# Patient Record
Sex: FEMALE | Race: WHITE | NOT HISPANIC OR LATINO | URBAN - METROPOLITAN AREA
[De-identification: names, ages, dates, MRNs, and addresses within clinical notes are randomized per-mention and may not be internally consistent; named-entity substitution may affect disease eponyms.]

---

## 2020-05-16 ENCOUNTER — EMERGENCY (EMERGENCY)
Facility: HOSPITAL | Age: 25
LOS: 1 days | Discharge: HOME | End: 2020-05-16
Attending: EMERGENCY MEDICINE | Admitting: EMERGENCY MEDICINE
Payer: MEDICAID

## 2020-05-16 VITALS
TEMPERATURE: 98 F | SYSTOLIC BLOOD PRESSURE: 129 MMHG | OXYGEN SATURATION: 99 % | DIASTOLIC BLOOD PRESSURE: 66 MMHG | HEART RATE: 67 BPM | RESPIRATION RATE: 20 BRPM

## 2020-05-16 PROCEDURE — 99283 EMERGENCY DEPT VISIT LOW MDM: CPT

## 2020-05-16 RX ORDER — RABIES VACC, HUMAN DIPLOID/PF 2.5 UNIT
1 VIAL (EA) INTRAMUSCULAR ONCE
Refills: 0 | Status: COMPLETED | OUTPATIENT
Start: 2020-05-16 | End: 2020-05-16

## 2020-05-16 RX ORDER — TETANUS TOXOID, REDUCED DIPHTHERIA TOXOID AND ACELLULAR PERTUSSIS VACCINE, ADSORBED 5; 2.5; 8; 8; 2.5 [IU]/.5ML; [IU]/.5ML; UG/.5ML; UG/.5ML; UG/.5ML
0.5 SUSPENSION INTRAMUSCULAR ONCE
Refills: 0 | Status: COMPLETED | OUTPATIENT
Start: 2020-05-16 | End: 2020-05-16

## 2020-05-16 RX ORDER — RABIES VACC, HUMAN DIPLOID/PF 2.5 UNIT
1 VIAL (EA) INTRAMUSCULAR ONCE
Refills: 0 | Status: DISCONTINUED | OUTPATIENT
Start: 2020-05-16 | End: 2020-05-16

## 2020-05-16 RX ADMIN — Medication 1 MILLILITER(S): at 22:15

## 2020-05-16 RX ADMIN — TETANUS TOXOID, REDUCED DIPHTHERIA TOXOID AND ACELLULAR PERTUSSIS VACCINE, ADSORBED 0.5 MILLILITER(S): 5; 2.5; 8; 8; 2.5 SUSPENSION INTRAMUSCULAR at 21:55

## 2020-05-16 RX ADMIN — Medication 1 TABLET(S): at 21:54

## 2020-05-16 NOTE — ED PROVIDER NOTE - ATTENDING CONTRIBUTION TO CARE
I performed a face-to-face evaluation of the patient and performed a history and physical examination. I agree with the history and physical examination.    Bit by unknown pit bull on L 2nd digit distal area. Tetanus shot status unknown. No e/o tendon injury. NVI. Small, non-suturable lac there. Give tetanus shot, rabies vaccine series, and Augmentin.

## 2020-05-16 NOTE — ED PROVIDER NOTE - PROGRESS NOTE DETAILS
Radha PGY1: Patient reevaluated and feeling better. VSS. Reviewed and discussed results with patient. Discussed importance of follow up and return precautions. Patient agrees with plan.

## 2020-05-16 NOTE — ED ADULT TRIAGE NOTE - CHIEF COMPLAINT QUOTE
"I was walking and I pet a dog, and he bit me, it was a stranger dog I am not sure if he was vaccinated" bite on right index finger

## 2020-05-16 NOTE — ED PROVIDER NOTE - PHYSICAL EXAMINATION
Physical Exam:  Gen: NAD, AOx3, non-toxic appearing, able to ambulate without assistance  MSK: no visible deformities, ROM normal in UE/LE, no back TTP  Neuro: No focal sensory or motor deficits  Skin: 2 vertical 2 cm superficial hemostatic scrapes of dorsal 2nd digit L hand extending from distal to DIP to nailbed w/ mild swelling and minimal TTP, warm, well perfused, no rash, no leg swelling  Psych: normal affect, calm

## 2020-05-16 NOTE — ED ADULT NURSE NOTE - OBJECTIVE STATEMENT
Patient is a 24y female, A&OX4, ambulatory, complaining of a dog bite to the left hand ring finger. Vaccination status of the dog is unknown. Unknown last Tetanus shot. Medications administered as per order.

## 2020-05-16 NOTE — ED PROVIDER NOTE - DISPOSITION TYPE
Patient Education     Nasal Surgery: Septoplasty    You’re scheduled to have nasal surgery. The type of nasal surgery you’re having is called septoplasty. Read on to learn more about what to expect during this surgery.During surgery, the surgeon may remove cartilage and bone to reshape the deviated septum. After surgery, there is more breathing space. Enough cartilage and bone remain to give the nose support.  What to expect during septoplasty  This surgery repairs a blockage inside the nose caused by a deviated septum. With a deviated septum, there is a problem with the wall that divides the nose into two chambers. A deviated septum may block air coming through one or both nostrils. This makes it harder for you to breathe through your nose. During septoplasty, the surgeon makes incisions inside the nose. Then the surgeon trims, reshapes, moves, or removes cartilage and sometimes bone from the septum.  Risks and possible complications  As with any surgery, nasal surgery has some risks. These include a slight risk of bleeding and infection. Your doctor will discuss any other risks and complications with you.   After septoplasty  After septoplasty, you’ll be taken to a recovery area or to your hospital room. Your experience may be as follows:  · You may have packing material inside your nose. This reduces bleeding and promotes healing. You may also have bandages (dressings) on the outside of your nose.  · It’s normal to have some mucus and blood drain from your nose. Until packing is removed, you may have to breathe through your mouth.  · You may have some swelling or bruising around the eyelids if a rhinoplasty was also done.  · Expect some throat dryness and irritation.  · Pain medicine will be prescribed as needed. Don’t take medicine that contains aspirin or ibuprofen. These can cause increased bleeding.  Follow-up care  You’ll need to follow up with your doctor within a week after your surgery. Here is what to  expect:  · Any packing, splint, or dressings will probably be removed. You may feel slight discomfort and bleed a little when this is done.  · After the splint or packing is removed, you’ll most likely breathe better than you did before surgery.  · You may have minor numbness, pain, swelling, and a little stiffness under the tip of the nose.  · In a few days, the inside of your nose may swell and briefly block your breathing. Or a scab or crust may block breathing for a short time. Leave the scab alone. Your doctor can remove it. Using saline (irrigation or aerosol) regularly after surgery helps to reduce the amount of crusting at each visit.  · Contact your healthcare provider if you have any questions or concerns.  Date Last Reviewed: 11/1/2016  © 6122-1914 [employer]. [address] [place] [address], [place] [address]. All rights reserved. This information is not intended as a substitute for professional medical care. Always follow your healthcare professional's instructions.            DISCHARGE

## 2020-05-16 NOTE — ED PROVIDER NOTE - NSFOLLOWUPINSTRUCTIONS_ED_ALL_ED_FT
- Continue all regular medications  - For pain, take tylenol or ibuprofen as directed on the packaging  - Return to the ER on 5/19, 5/23 and 5/30 for the remaining rabies vaccination series  - You were given copies of labs and/or imaging results if applicable, please take them to your follow up appointments  - Return to the ER for fever, severe swelling and pain or pus at the site of the bite or any worsening symptoms or concerns

## 2020-05-16 NOTE — ED ADULT NURSE NOTE - NSIMPLEMENTINTERV_GEN_ALL_ED
Implemented All Universal Safety Interventions:  [place] to call system. Call bell, personal items and telephone within reach. Instruct patient to call for assistance. Room bathroom lighting operational. Non-slip footwear when patient is off stretcher. Physically safe environment: no spills, clutter or unnecessary equipment. Stretcher in lowest position, wheels locked, appropriate side rails in place.

## 2020-05-16 NOTE — ED PROVIDER NOTE - CLINICAL SUMMARY MEDICAL DECISION MAKING FREE TEXT BOX
Azeb: Bit by unknown pit bull on L 2nd digit distal area. Tetanus shot status unknown. No e/o tendon injury. NVI. Small, non-suturable lac there. Give tetanus shot, rabies vaccine series, and Augmentin.

## 2020-05-16 NOTE — ED PROVIDER NOTE - OBJECTIVE STATEMENT
23yo female no PMH p/w request for rabbies vaccine s/p dog bite to L 2nd digit this evening. Dog had owner but was unknown to patient and vaccination status of dog unknown. Went to  and referred to ED as they did not have vaccine. Patient thoroughly washed wound with soap and water and now hemostatic. Little tissue available around wound for immunoglobulin, patient agrees not to receive immunoglobulin at this time.

## 2020-05-16 NOTE — ED PROVIDER NOTE - PATIENT PORTAL LINK FT
You can access the FollowMyHealth Patient Portal offered by [facility] by registering at the following website: [email or web address]. By joining [employer]’s FollowMyHealth portal, you will also be able to view your health information using other applications (apps) compatible with our system.

## 2020-05-19 ENCOUNTER — EMERGENCY (EMERGENCY)
Facility: HOSPITAL | Age: 25
LOS: 1 days | Discharge: HOME | End: 2020-05-19
Attending: STUDENT IN AN ORGANIZED HEALTH CARE EDUCATION/TRAINING PROGRAM | Admitting: STUDENT IN AN ORGANIZED HEALTH CARE EDUCATION/TRAINING PROGRAM
Payer: MEDICAID

## 2020-05-19 VITALS
DIASTOLIC BLOOD PRESSURE: 61 MMHG | HEART RATE: 69 BPM | SYSTOLIC BLOOD PRESSURE: 111 MMHG | OXYGEN SATURATION: 99 % | RESPIRATION RATE: 16 BRPM | TEMPERATURE: 99 F

## 2020-05-19 PROCEDURE — 99281 EMR DPT VST MAYX REQ PHY/QHP: CPT

## 2020-05-19 RX ORDER — RABIES VACC, HUMAN DIPLOID/PF 2.5 UNIT
1 VIAL (EA) INTRAMUSCULAR ONCE
Refills: 0 | Status: COMPLETED | OUTPATIENT
Start: 2020-05-19 | End: 2020-05-19

## 2020-05-19 RX ORDER — RABIES VACC, HUMAN DIPLOID/PF 2.5 UNIT
1 VIAL (EA) INTRAMUSCULAR ONCE
Refills: 0 | Status: DISCONTINUED | OUTPATIENT
Start: 2020-05-19 | End: 2020-05-19

## 2020-05-19 RX ADMIN — Medication 1 MILLILITER(S): at 22:25

## 2020-05-19 NOTE — ED PROVIDER NOTE - PHYSICAL EXAMINATION
Vital signs reviewed.   CONSTITUTIONAL: Well-appearing; well-nourished; in no apparent distress. Non-toxic appearing.   HEAD: Normocephalic, atraumatic.  EYES: PERRL, EOM intact, conjunctiva and sclera WNL.  ENT: normal nose; no rhinorrhea  CARD: Normal S1, S2; no murmurs, rubs, or gallops noted.  RESP: Normal chest excursion with respiration; breath sounds clear and equal bilaterally; no wheezes, rhonchi, or rales.  EXT/MS: moves all extremities;   SKIN: Normal for age and race; warm; dry; good turgor; no apparent lesions or exudate noted. Small abrasion noted Lt 2nd digit distal, appears well healing, mild erythema, no lymphangitis or worsening signs of infection noted. FROM noted.   NEURO: Awake, alert, oriented x 3, no gross deficits, no motor or sensory deficit noted.  PSYCH: Normal mood; appropriate affect.

## 2020-05-19 NOTE — ED PROVIDER NOTE - ATTENDING CONTRIBUTION TO CARE
25yo F here for rabies vaccine, first vaccine and IG 3 days ago for dog bite on L 2nd digit. no complaints, no swelling, no redness, no pain to site, no discharge from area, no fevers  will give 2nd vaccine and dc with return instructions for next vaccine

## 2020-05-19 NOTE — ED ADULT NURSE NOTE - OBJECTIVE STATEMENT
patient aaox3. ambulatory w/o assist, came in for rabies vaccine after being bit by a dog on saturday. patient denies pain or discomfort to bite site which is left hand. site clean and dry. skin intact. patient denies dizziness, weakness, lightheadedness, confusion, shortness of breath, palpitations, pain anywhere. received 1st round of rabies vaccine. administered to left shoulder with no complication. patient dced home.

## 2020-05-19 NOTE — ED PROVIDER NOTE - NSFOLLOWUPINSTRUCTIONS_ED_ALL_ED_FT
Continue all regular medications  - For pain, take tylenol or ibuprofen as directed on the packaging  - Return to the ER on 5/23 and 5/30 for the remaining rabies vaccination series  - Return to the ER for fever, severe swelling and pain or pus at the site of the bite or any worsening symptoms or concerns

## 2020-05-19 NOTE — ED PROVIDER NOTE - CLINICAL SUMMARY MEDICAL DECISION MAKING FREE TEXT BOX
Patient is a 24 y.o female with no significant PMHx who presents to ED for 2nd rabies vaccine. DDx- rabies vaccine. Pt to be dc home, advised to continue home meds and return on 5/23 and 5/30 for 3rd and 4th dose. Pt understood and agreed with plan. Strict return instructions given.

## 2020-05-19 NOTE — ED PROVIDER NOTE - OBJECTIVE STATEMENT
HPI- Patient is a 24 y.o female with no significant PMHx who presents to ED for 2nd rabies vaccine. Pt states on saturday 5/16 she was bit by a dog on 2nd digit Lt hand. Given tetanus, Rabies vaccine and augmentin. Pt has no complaints. Denies fevers, chills, worsening infection or any other complaints.

## 2020-05-20 PROBLEM — Z78.9 OTHER SPECIFIED HEALTH STATUS: Chronic | Status: ACTIVE | Noted: 2020-05-16

## 2020-05-23 ENCOUNTER — EMERGENCY (EMERGENCY)
Facility: HOSPITAL | Age: 25
LOS: 1 days | Discharge: HOME | End: 2020-05-23
Attending: EMERGENCY MEDICINE | Admitting: EMERGENCY MEDICINE
Payer: MEDICAID

## 2020-05-23 VITALS
TEMPERATURE: 98 F | DIASTOLIC BLOOD PRESSURE: 54 MMHG | SYSTOLIC BLOOD PRESSURE: 104 MMHG | OXYGEN SATURATION: 100 % | HEART RATE: 84 BPM | RESPIRATION RATE: 16 BRPM

## 2020-05-23 VITALS
SYSTOLIC BLOOD PRESSURE: 104 MMHG | RESPIRATION RATE: 15 BRPM | HEART RATE: 83 BPM | TEMPERATURE: 98 F | OXYGEN SATURATION: 100 % | DIASTOLIC BLOOD PRESSURE: 60 MMHG

## 2020-05-23 PROCEDURE — 99281 EMR DPT VST MAYX REQ PHY/QHP: CPT

## 2020-05-23 RX ORDER — RABIES VACC, HUMAN DIPLOID/PF 2.5 UNIT
1 VIAL (EA) INTRAMUSCULAR ONCE
Refills: 0 | Status: COMPLETED | OUTPATIENT
Start: 2020-05-23 | End: 2020-05-23

## 2020-05-23 RX ADMIN — Medication 1 MILLILITER(S): at 22:22

## 2020-05-23 NOTE — ED PROVIDER NOTE - NSFOLLOWUPINSTRUCTIONS_ED_ALL_ED_FT
Continue all regular medications  - For pain, take tylenol or ibuprofen as directed on the packaging  - Return to the ER on 5/30 for the remaining rabies vaccination series  - Return to the ER for fever, severe swelling and pain or pus at the site of the bite or any worsening symptoms or concerns

## 2020-05-23 NOTE — ED PROVIDER NOTE - CLINICAL SUMMARY MEDICAL DECISION MAKING FREE TEXT BOX
Patient is a 24 y.o female with no significant PMHx who presents to ED for 2nd rabies vaccine. DDx- rabies vaccine. Pt to be dc home, advised to continue home meds and return on 5/30 for 4th dose. Pt understood and agreed with plan. Strict return instructions given. Patient is a 24 y.o female with no significant PMHx who presents to ED for 3rd rabies vaccine. DDx- rabies vaccine. Pt to be dc home, advised to continue home meds and return on 5/30 for 4th dose. Pt understood and agreed with plan. Strict return instructions given. Rebekah att: 23 yo F with no significant PMHx who presents to ED for 3rd rabies vaccine. DDx- rabies vaccine. Pt to be dc home, advised to continue home meds and return on 5/30 for 4th dose. Pt understood and agreed with plan. Strict return instructions given.

## 2020-05-23 NOTE — ED ADULT NURSE NOTE - OBJECTIVE STATEMENT
pt is alert and oriented, breathing even and unlabored, here with no complaints, receiving 3/4 in a series of rabies shots for a past dog bite.

## 2020-05-23 NOTE — ED PROVIDER NOTE - OBJECTIVE STATEMENT
Patient is a 24 y.o female with no significant PMHx who presents to ED for 3nd rabies vaccine. Pt states on saturday 5/16 she was bit by a dog on 2nd digit Lt hand. Given tetanus, Rabies vaccine and augmentin during initial visit. Pt has no complaints. Denies fevers, chills, worsening infection or any other complaints.

## 2020-05-23 NOTE — ED PROVIDER NOTE - PATIENT PORTAL LINK FT
You can access the FollowMyHealth Patient Portal offered by [facility] by registering at the following website: [email or web address]. By joining [employer]’s FollowMyHealth portal, you will also be able to view your health information using other applications (apps) compatible with our system.

## 2020-05-23 NOTE — ED PROVIDER NOTE - PHYSICAL EXAMINATION
CONSTITUTIONAL: Well-appearing; well-nourished; in no apparent distress. Non-toxic appearing.   HEAD: Normocephalic, atraumatic.  EYES: PERRL, EOM intact, conjunctiva and sclera WNL.  ENT: normal nose; no rhinorrhea  CARD: Normal S1, S2; no murmurs, rubs, or gallops noted.  RESP: Normal chest excursion with respiration; breath sounds clear and equal bilaterally; no wheezes, rhonchi, or rales.  EXT/MS: moves all extremities;   SKIN: Normal for age and race; warm; dry; good turgor; no apparent lesions or exudate noted. Small abrasion noted Lt 2nd digit distal, appears well healing, mild erythema, no lymphangitis or worsening signs of infection noted. FROM noted.   NEURO: Awake, alert, oriented x 3, no gross deficits, no motor or sensory deficit noted.  PSYCH: Normal mood; appropriate affect.

## 2020-05-30 ENCOUNTER — EMERGENCY (EMERGENCY)
Facility: HOSPITAL | Age: 25
LOS: 1 days | Discharge: HOME | End: 2020-05-30
Attending: EMERGENCY MEDICINE | Admitting: EMERGENCY MEDICINE
Payer: MEDICAID

## 2020-05-30 VITALS
RESPIRATION RATE: 16 BRPM | TEMPERATURE: 98 F | DIASTOLIC BLOOD PRESSURE: 78 MMHG | SYSTOLIC BLOOD PRESSURE: 112 MMHG | OXYGEN SATURATION: 98 % | HEART RATE: 77 BPM

## 2020-05-30 PROCEDURE — 99282 EMERGENCY DEPT VISIT SF MDM: CPT

## 2020-05-30 RX ORDER — RABIES VACC, HUMAN DIPLOID/PF 2.5 UNIT
1 VIAL (EA) INTRAMUSCULAR ONCE
Refills: 0 | Status: COMPLETED | OUTPATIENT
Start: 2020-05-30 | End: 2020-05-30

## 2020-05-30 RX ADMIN — Medication 1 MILLILITER(S): at 20:12

## 2020-05-30 NOTE — ED PROVIDER NOTE - CLINICAL SUMMARY MEDICAL DECISION MAKING FREE TEXT BOX
25yo female presenting for 4th rabies vaccine after dog bite. well healed wound, no infectious symptoms. Will give last vaccine and dc home.

## 2020-05-30 NOTE — ED PROVIDER NOTE - OBJECTIVE STATEMENT
23yo female here for 4th rabies vaccine s/p dog bite 2 weeks ago to L 2nd digit. Received tetanus, 3 rabies vaccines and course of augmentin. No pain or erythema at finger and wound has healed well. Denies fever. 25yo female here for 4th rabies vaccine s/p dog bite 2 weeks ago to L 2nd digit. Received tetanus, 3 rabies vaccines and course of augmentin. No pain or erythema at finger and wound has healed well. Denies fever.    Attending/Alicia: 23 yo F as described above, s/p dog ite from unknown dog with owner but unable to get vaccination records. Here today for 4th rabies vaccine dose. She reports finger healed well, no complaints.

## 2020-05-30 NOTE — ED PROVIDER NOTE - PATIENT PORTAL LINK FT
You can access the FollowMyHealth Patient Portal offered by [facility] by registering at the following website: [email or web address]. By joining [facility]’s FollowMyHealth portal, you will also be able to view your health information using other applications (apps) compatible with our system.

## 2020-05-30 NOTE — ED ADULT NURSE NOTE - NSIMPLEMENTINTERV_GEN_ALL_ED
Implemented All Universal Safety Interventions:  [place] to call system. Call bell, personal items and telephone within reach. Instruct patient to call for assistance. Room bathroom lighting operational. Non-slip footwear when patient is off stretcher. Physically safe environment: no spills, clutter or unnecessary equipment. Stretcher in lowest position, wheels locked, appropriate side rails in place.

## 2020-05-30 NOTE — ED PROVIDER NOTE - NSFOLLOWUPINSTRUCTIONS_ED_ALL_ED_FT
- Continue all regular medications  - For pain, take tylenol or ibuprofen as directed on the packaging  - Follow up with your primary doctor as needed  - Return to the ER for fever, swelling or redness at site of wound or any worsening symptoms or concerns

## 2022-04-05 NOTE — ED PROVIDER NOTE - NS_EDPROVIDERDISPOUSERTYPE_ED_A_ED
CXR negative - No infiltrates, No consolidation, No atelectasis seen. No CHF, + cardiomegaly, No pleural effusions
Attending Attestation (For Attendings USE Only)...

## 2022-12-14 NOTE — ED PROVIDER NOTE - PRO INTERPRETER NEED 2
Detail Level: Detailed Quality 431: Preventive Care And Screening: Unhealthy Alcohol Use - Screening: Patient not identified as an unhealthy alcohol user when screened for unhealthy alcohol use using a systematic screening method Quality 110: Preventive Care And Screening: Influenza Immunization: Influenza immunization was not ordered or administered, reason not given Quality 226: Preventive Care And Screening: Tobacco Use: Screening And Cessation Intervention: Patient screened for tobacco use and is an ex/non-smoker English

## 2024-06-21 ENCOUNTER — APPOINTMENT (OUTPATIENT)
Dept: INTERNAL MEDICINE | Facility: CLINIC | Age: 29
End: 2024-06-21

## 2024-06-25 ENCOUNTER — APPOINTMENT (OUTPATIENT)
Dept: INTERNAL MEDICINE | Facility: CLINIC | Age: 29
End: 2024-06-25
Payer: COMMERCIAL

## 2024-06-25 ENCOUNTER — NON-APPOINTMENT (OUTPATIENT)
Age: 29
End: 2024-06-25

## 2024-06-25 VITALS
DIASTOLIC BLOOD PRESSURE: 65 MMHG | HEART RATE: 71 BPM | WEIGHT: 113 LBS | SYSTOLIC BLOOD PRESSURE: 105 MMHG | BODY MASS INDEX: 19.29 KG/M2 | TEMPERATURE: 97.8 F | HEIGHT: 64 IN | OXYGEN SATURATION: 98 %

## 2024-06-25 DIAGNOSIS — Z81.8 FAMILY HISTORY OF OTHER MENTAL AND BEHAVIORAL DISORDERS: ICD-10-CM

## 2024-06-25 DIAGNOSIS — Z00.00 ENCOUNTER FOR GENERAL ADULT MEDICAL EXAMINATION W/OUT ABNORMAL FINDINGS: ICD-10-CM

## 2024-06-25 DIAGNOSIS — F41.8 OTHER SPECIFIED ANXIETY DISORDERS: ICD-10-CM

## 2024-06-25 DIAGNOSIS — Z13.228 ENCOUNTER FOR SCREENING FOR OTHER METABOLIC DISORDERS: ICD-10-CM

## 2024-06-25 PROCEDURE — G2211 COMPLEX E/M VISIT ADD ON: CPT | Mod: NC,1L

## 2024-06-25 PROCEDURE — 99204 OFFICE O/P NEW MOD 45 MIN: CPT

## 2024-06-25 PROCEDURE — 99385 PREV VISIT NEW AGE 18-39: CPT

## 2024-06-25 PROCEDURE — 36415 COLL VENOUS BLD VENIPUNCTURE: CPT

## 2024-06-28 LAB
ALBUMIN SERPL ELPH-MCNC: 4.5 G/DL
ALP BLD-CCNC: 41 U/L
ALT SERPL-CCNC: 13 U/L
ANION GAP SERPL CALC-SCNC: 10 MMOL/L
AST SERPL-CCNC: 16 U/L
BASOPHILS # BLD AUTO: 0.06 K/UL
BASOPHILS NFR BLD AUTO: 1.5 %
BILIRUB SERPL-MCNC: 1.5 MG/DL
BUN SERPL-MCNC: 13 MG/DL
CALCIUM SERPL-MCNC: 9.4 MG/DL
CHLORIDE SERPL-SCNC: 106 MMOL/L
CHOLEST SERPL-MCNC: 172 MG/DL
CO2 SERPL-SCNC: 23 MMOL/L
CREAT SERPL-MCNC: 0.71 MG/DL
EGFR: 117 ML/MIN/1.73M2
EOSINOPHIL # BLD AUTO: 0.14 K/UL
EOSINOPHIL NFR BLD AUTO: 3.4 %
ESTIMATED AVERAGE GLUCOSE: 103 MG/DL
FERRITIN SERPL-MCNC: 23 NG/ML
FOLATE SERPL-MCNC: 14.2 NG/ML
GLUCOSE SERPL-MCNC: 95 MG/DL
HBA1C MFR BLD HPLC: 5.2 %
HCT VFR BLD CALC: 36.4 %
HDLC SERPL-MCNC: 67 MG/DL
HGB BLD-MCNC: 12 G/DL
IMM GRANULOCYTES NFR BLD AUTO: 0.2 %
IRON SATN MFR SERPL: 17 %
IRON SERPL-MCNC: 54 UG/DL
LDLC SERPL CALC-MCNC: 93 MG/DL
LYMPHOCYTES # BLD AUTO: 1.77 K/UL
LYMPHOCYTES NFR BLD AUTO: 43.2 %
MAN DIFF?: NORMAL
MCHC RBC-ENTMCNC: 30.2 PG
MCHC RBC-ENTMCNC: 33 GM/DL
MCV RBC AUTO: 91.5 FL
MONOCYTES # BLD AUTO: 0.5 K/UL
MONOCYTES NFR BLD AUTO: 12.2 %
NEUTROPHILS # BLD AUTO: 1.62 K/UL
NEUTROPHILS NFR BLD AUTO: 39.5 %
NONHDLC SERPL-MCNC: 104 MG/DL
PLATELET # BLD AUTO: 217 K/UL
POTASSIUM SERPL-SCNC: 5.2 MMOL/L
PROT SERPL-MCNC: 7 G/DL
RBC # BLD: 3.98 M/UL
RBC # FLD: 13.8 %
SODIUM SERPL-SCNC: 139 MMOL/L
TIBC SERPL-MCNC: 309 UG/DL
TRIGL SERPL-MCNC: 57 MG/DL
TSH SERPL-ACNC: 1.79 UIU/ML
UIBC SERPL-MCNC: 255 UG/DL
VIT B12 SERPL-MCNC: 252 PG/ML
WBC # FLD AUTO: 4.1 K/UL